# Patient Record
Sex: FEMALE | Race: WHITE | NOT HISPANIC OR LATINO | Employment: OTHER | URBAN - NONMETROPOLITAN AREA
[De-identification: names, ages, dates, MRNs, and addresses within clinical notes are randomized per-mention and may not be internally consistent; named-entity substitution may affect disease eponyms.]

---

## 2018-01-25 ENCOUNTER — OFFICE VISIT (OUTPATIENT)
Dept: CARDIOLOGY | Facility: CLINIC | Age: 83
End: 2018-01-25

## 2018-01-25 VITALS
SYSTOLIC BLOOD PRESSURE: 90 MMHG | OXYGEN SATURATION: 98 % | DIASTOLIC BLOOD PRESSURE: 60 MMHG | HEART RATE: 69 BPM | HEIGHT: 65 IN | WEIGHT: 127 LBS | BODY MASS INDEX: 21.16 KG/M2

## 2018-01-25 DIAGNOSIS — I95.1 ORTHOSTATIC HYPOTENSION: ICD-10-CM

## 2018-01-25 DIAGNOSIS — R42 DIZZINESS: Primary | ICD-10-CM

## 2018-01-25 DIAGNOSIS — E78.5 DYSLIPIDEMIA: ICD-10-CM

## 2018-01-25 DIAGNOSIS — I10 ESSENTIAL HYPERTENSION: ICD-10-CM

## 2018-01-25 PROBLEM — K21.9 GERD (GASTROESOPHAGEAL REFLUX DISEASE): Status: ACTIVE | Noted: 2018-01-25

## 2018-01-25 PROBLEM — E03.9 HYPOTHYROID: Status: ACTIVE | Noted: 2018-01-25

## 2018-01-25 PROCEDURE — 93000 ELECTROCARDIOGRAM COMPLETE: CPT | Performed by: INTERNAL MEDICINE

## 2018-01-25 PROCEDURE — 99204 OFFICE O/P NEW MOD 45 MIN: CPT | Performed by: INTERNAL MEDICINE

## 2018-01-25 RX ORDER — FLUTICASONE PROPIONATE 50 MCG
2 SPRAY, SUSPENSION (ML) NASAL DAILY
COMMUNITY

## 2018-01-25 NOTE — PROGRESS NOTES
Reason for Visit: Dizziness.    HPI:  Aicha Mendoza is a 84 y.o. female is being seen for consultation today at the request of Silvana Wheatley*.  She has been dizzy for about a year.  It is intermittent.  Usually happens when she is standing.  Sometimes she will lay her head on the counter until the dizziness subsides.  Usually lasts about 5 to 10 minutes.  It never happens when laying or sitting.  She recently had an unremarkable Holter monitor done in August.  She does not feel like she drinks much fluid during the day.    Previous Cardiac Testing and Procedures:  - Holter monitor ((08/15/2017) rare atrial ectopic beats with a 4 beat run of nonsustained SVT, rare PVCs, no symptoms reported    Patient Active Problem List   Diagnosis   • Essential hypertension   • GERD (gastroesophageal reflux disease)   • Hypothyroid   • Dyslipidemia       Social History   Substance Use Topics   • Smoking status: Former Smoker     Packs/day: 0.50     Types: Cigarettes   • Smokeless tobacco: Former User   • Alcohol use No       Family History   Problem Relation Age of Onset   • No Known Problems Father        The following portions of the patient's history were reviewed and updated as appropriate: allergies, current medications, past family history, past medical history, past social history, past surgical history and problem list.      Current Outpatient Prescriptions:   •  aspirin 81 MG EC tablet, Take 81 mg by mouth Daily., Disp: , Rfl:   •  calcium carbonate (OS-KINGSLEY) 600 MG tablet, Take 600 mg by mouth Daily., Disp: , Rfl:   •  Coenzyme Q10 (CO Q 10 PO), Take  by mouth., Disp: , Rfl:   •  fluticasone (FLONASE) 50 MCG/ACT nasal spray, 2 sprays into each nostril Daily., Disp: , Rfl:   •  glucosamine-chondroitin 500-400 MG capsule capsule, Take  by mouth 3 (Three) Times a Day With Meals., Disp: , Rfl:   •  levothyroxine (SYNTHROID, LEVOTHROID) 50 MCG tablet, Take 50 mcg by mouth Daily., Disp: , Rfl:   •  memantine  "(NAMENDA) 5 MG tablet, Take 5 mg by mouth 2 (Two) Times a Day., Disp: , Rfl:   •  Multiple Vitamins-Minerals (OCUVITE ADULT 50+) capsule, Take  by mouth., Disp: , Rfl:   •  omeprazole (priLOSEC) 40 MG capsule, Take 40 mg by mouth Daily., Disp: , Rfl:   •  pravastatin (PRAVACHOL) 40 MG tablet, Take 40 mg by mouth Daily., Disp: , Rfl:   •  timolol (TIMOPTIC) 0.5 % ophthalmic solution, 1 drop 2 (Two) Times a Day., Disp: , Rfl:     Review of Systems   Constitution: Positive for weight loss. Negative for chills, decreased appetite and fever.   HENT: Positive for congestion. Negative for nosebleeds.    Eyes: Positive for blurred vision. Negative for double vision.   Cardiovascular: Negative for chest pain, irregular heartbeat, leg swelling and palpitations.   Respiratory: Negative for cough and shortness of breath.    Endocrine: Negative for cold intolerance and heat intolerance.   Hematologic/Lymphatic: Does not bruise/bleed easily.   Skin: Negative for dry skin, itching and rash.   Musculoskeletal: Negative for back pain, joint pain, muscle cramps and neck pain.   Gastrointestinal: Positive for constipation, heartburn and melena. Negative for abdominal pain, diarrhea and hemorrhoids.   Genitourinary: Negative for dysuria, frequency and hematuria.   Neurological: Positive for dizziness. Negative for headaches, light-headedness and loss of balance.   Psychiatric/Behavioral: Negative for depression. The patient has insomnia. The patient is not nervous/anxious.        Objective   BP 90/60 (BP Location: Left arm, Patient Position: Sitting, Cuff Size: Adult)  Pulse 69  Ht 165.1 cm (65\")  Wt 57.6 kg (127 lb)  SpO2 98%  BMI 21.13 kg/m2  Physical Exam   Constitutional: She is oriented to person, place, and time. She appears well-developed and well-nourished.   HENT:   Head: Normocephalic and atraumatic.   Eyes: Conjunctivae and EOM are normal. Pupils are equal, round, and reactive to light.   Neck: Normal range of motion. " Neck supple. No JVD present. No thyromegaly present.   Cardiovascular: Normal rate, regular rhythm and normal heart sounds.    No murmur heard.  Pulmonary/Chest: Effort normal and breath sounds normal. She has no wheezes. She has no rales.   Abdominal: Soft. Bowel sounds are normal. She exhibits no distension. There is no tenderness.   Musculoskeletal: Normal range of motion. She exhibits no edema.   Neurological: She is alert and oriented to person, place, and time. Coordination normal.   Skin: Skin is warm and dry. No rash noted.   Psychiatric: She has a normal mood and affect. Her behavior is normal.       ECG 12 Lead  Date/Time: 1/25/2018 10:20 AM  Performed by: GABRIELA FLAHERTY  Authorized by: GABRIELA FLAHERTY   Comparison: compared with previous ECG from 8/15/2017  Similar to previous ECG  Rhythm: sinus rhythm  Rate: normal  Clinical impression: normal ECG              ICD-10-CM ICD-9-CM   1. Dizziness R42 780.4   2. Orthostatic hypotension I95.1 458.0   3. Essential hypertension I10 401.9   4. Dyslipidemia E78.5 272.4         Assessment/Plan:  1. Dizziness:  Secondary to orthostatic hypotension.  Patient had a recent unremarkable event monitor.    2.  Orthostatic hypotension: Recommend trial of conservative measures.  Counseled patient on the importance of staying well-hydrated which she does not currently do.  Also discussed increasing her sodium intake.  We will also add compression stockings.  If she continues to remain symptomatic then starting medical therapy such as Midrin would be reasonable.    3.  Dyslipidemia: Managed on pravastatin.    4.  History of hypertension: Patient is now hypotensive and is off all her antihypertensive drugs.